# Patient Record
Sex: FEMALE | Race: WHITE | Employment: FULL TIME | URBAN - NONMETROPOLITAN AREA
[De-identification: names, ages, dates, MRNs, and addresses within clinical notes are randomized per-mention and may not be internally consistent; named-entity substitution may affect disease eponyms.]

---

## 2024-07-10 ENCOUNTER — HOSPITAL ENCOUNTER (OUTPATIENT)
Age: 49
Setting detail: OUTPATIENT SURGERY
Discharge: HOME | End: 2024-07-10
Attending: SPECIALIST | Admitting: SPECIALIST

## 2024-07-10 VITALS
SYSTOLIC BLOOD PRESSURE: 110 MMHG | HEART RATE: 66 BPM | WEIGHT: 178.2 LBS | HEIGHT: 70 IN | RESPIRATION RATE: 16 BRPM | TEMPERATURE: 97 F | BODY MASS INDEX: 25.51 KG/M2 | OXYGEN SATURATION: 96 % | DIASTOLIC BLOOD PRESSURE: 68 MMHG

## 2024-07-10 LAB — PREGNANCY, URINE: NEGATIVE

## 2024-07-10 PROCEDURE — 3600000002 HC SURGERY LEVEL 2 BASE: Performed by: SPECIALIST

## 2024-07-10 PROCEDURE — 7100000010 HC PHASE II RECOVERY - FIRST 15 MIN: Performed by: SPECIALIST

## 2024-07-10 PROCEDURE — 7100000011 HC PHASE II RECOVERY - ADDTL 15 MIN: Performed by: SPECIALIST

## 2024-07-10 PROCEDURE — 6370000000 HC RX 637 (ALT 250 FOR IP): Performed by: SPECIALIST

## 2024-07-10 PROCEDURE — 81025 URINE PREGNANCY TEST: CPT

## 2024-07-10 PROCEDURE — 2500000003 HC RX 250 WO HCPCS: Performed by: SPECIALIST

## 2024-07-10 PROCEDURE — 6370000000 HC RX 637 (ALT 250 FOR IP)

## 2024-07-10 PROCEDURE — 99153 MOD SED SAME PHYS/QHP EA: CPT | Performed by: SPECIALIST

## 2024-07-10 PROCEDURE — 99152 MOD SED SAME PHYS/QHP 5/>YRS: CPT | Performed by: SPECIALIST

## 2024-07-10 PROCEDURE — 3600000012 HC SURGERY LEVEL 2 ADDTL 15MIN: Performed by: SPECIALIST

## 2024-07-10 PROCEDURE — 2709999900 HC NON-CHARGEABLE SUPPLY: Performed by: SPECIALIST

## 2024-07-10 PROCEDURE — 6360000002 HC RX W HCPCS: Performed by: SPECIALIST

## 2024-07-10 RX ORDER — ONDANSETRON 4 MG/1
4 TABLET, ORALLY DISINTEGRATING ORAL ONCE
Status: COMPLETED | OUTPATIENT
Start: 2024-07-10 | End: 2024-07-10

## 2024-07-10 RX ORDER — ONDANSETRON 4 MG/1
TABLET, ORALLY DISINTEGRATING ORAL
Status: COMPLETED
Start: 2024-07-10 | End: 2024-07-10

## 2024-07-10 RX ORDER — LIDOCAINE HYDROCHLORIDE AND EPINEPHRINE 10; 10 MG/ML; UG/ML
INJECTION, SOLUTION INFILTRATION; PERINEURAL PRN
Status: DISCONTINUED | OUTPATIENT
Start: 2024-07-10 | End: 2024-07-10 | Stop reason: ALTCHOICE

## 2024-07-10 RX ORDER — BUPROPION HYDROCHLORIDE 150 MG/1
150 TABLET ORAL DAILY
COMMUNITY

## 2024-07-10 RX ORDER — SCOLOPAMINE TRANSDERMAL SYSTEM 1 MG/1
1 PATCH, EXTENDED RELEASE TRANSDERMAL ONCE
Status: DISCONTINUED | OUTPATIENT
Start: 2024-07-10 | End: 2024-07-10 | Stop reason: HOSPADM

## 2024-07-10 RX ORDER — FENTANYL CITRATE 50 UG/ML
INJECTION, SOLUTION INTRAMUSCULAR; INTRAVENOUS PRN
Status: DISCONTINUED | OUTPATIENT
Start: 2024-07-10 | End: 2024-07-10 | Stop reason: ALTCHOICE

## 2024-07-10 RX ORDER — BACITRACIN 500 [USP'U]/G
OINTMENT OPHTHALMIC PRN
Status: DISCONTINUED | OUTPATIENT
Start: 2024-07-10 | End: 2024-07-10 | Stop reason: ALTCHOICE

## 2024-07-10 RX ORDER — LEVOTHYROXINE SODIUM 0.05 MG/1
50 TABLET ORAL DAILY
COMMUNITY

## 2024-07-10 RX ORDER — MIDAZOLAM HYDROCHLORIDE 1 MG/ML
INJECTION INTRAMUSCULAR; INTRAVENOUS PRN
Status: DISCONTINUED | OUTPATIENT
Start: 2024-07-10 | End: 2024-07-10 | Stop reason: ALTCHOICE

## 2024-07-10 RX ORDER — ONDANSETRON 2 MG/ML
INJECTION INTRAMUSCULAR; INTRAVENOUS PRN
Status: DISCONTINUED | OUTPATIENT
Start: 2024-07-10 | End: 2024-07-10 | Stop reason: ALTCHOICE

## 2024-07-10 RX ADMIN — ONDANSETRON 4 MG: 4 TABLET, ORALLY DISINTEGRATING ORAL at 09:28

## 2024-07-10 ASSESSMENT — PAIN - FUNCTIONAL ASSESSMENT
PAIN_FUNCTIONAL_ASSESSMENT: 0-10
PAIN_FUNCTIONAL_ASSESSMENT: NONE - DENIES PAIN

## 2024-07-10 ASSESSMENT — PAIN SCALES - GENERAL
PAINLEVEL_OUTOF10: 1
PAINLEVEL_OUTOF10: 2
PAINLEVEL_OUTOF10: 4
PAINLEVEL_OUTOF10: 1
PAINLEVEL_OUTOF10: 2
PAINLEVEL_OUTOF10: 1
PAINLEVEL_OUTOF10: 1

## 2024-07-10 NOTE — ANESTHESIA PRE-OP
candidate to undergo the planned procedure sedation and anesthesia. (Refer to nursing sedation/analgesia documentation record)  4. Formulation and discussion of sedation/procedure plan, risks, and expectations with patient and/or responsible adult completed.  5. Patient examined immediately prior to the procedure. (Refer to nursing sedation/analgesia documentation record)    Paco Lopez MD  Electronically signed 7/10/2024 at 7:35 AM

## 2024-07-10 NOTE — ANESTHESIA POST-OP
[facility]  Sedation/Analgesia Post Sedation Record    Pt Name: Maria Guadalupe Herndon  MRN: [id number]  YOB: 1975  Procedure Performed By: Paco Lopez MD   Primary Care Physician: Paco Wood MD    POST-PROCEDURE    Physicians/Assistants: Paco Lopez MD  Procedure Performed: bilateral upper blepharoplasty   Sedation/Anesthesia: Versed 2.5 mg IV, fentanyl 125 mcg IV,    Estimated Blood Loss:     2  ml  Specimens Removed:  none     Disposition of Specimen: none     Complications: None Immediately appreciated     Post-procedure Diagnosis/Findings:      Unacceptable cosmetic appearance, status post bilateral upper blepharoplasty         Recommendations:    Transfer to PACU           Paco Lopez MD  Electronically signed 7/10/2024 at 8:49 AM

## 2024-07-10 NOTE — OP NOTE
Operative Note    Patient name: Maria Guadalupe Herndon             Medical Record Number: [id number]    Primary Care Physician: Paco Wood MD     1975    Date of Procedure: 7/10/2024    Pre-operative Diagnosis:  Unacceptable cosmetic appearance.    Post-operative Diagnosis: Same    Procedure Performed:   1. Bilateral upper blepharoplasty    Surgeons/Assistants: Dr. Paco Lopez MD /Enma Hickey PA-C/Fuad Sheehan DPM    Estimated Blood Loss: 2 ml    Complications: none immediately appreciated    Procedure:    With the patient lying in the supine position and under adequate IV sedation per the anesthesia team. The area was prepped and draped in the standard surgical fashion.  7ml or 1% Lidocaine 1:100,000 with epinephrine solution after the amount of excess eyelid skin was determined with a pinch test.  After waiting for 20 minutes for the full local/epinephrine effect incisions were made along the superior tarsal crease and full thickness removal of the 8mm excess upper eyelid skin was performed bilaterally.  Excess fat of middle and medial upper eyelid compartments was conservatively removed.  Closure was then completed in both these areas using a 5-0 silk suture placed in interrupted fashion and then a 4-0 Monocryl suture placed in a running subcuticular fashion resulting in 2mm of expected lagophthalmus.  Mastisol and Steri-strips being applied as a final dressing holding the tails of the subcuticular closures to the nasal bridge and lateral canthal areas respectively.  Ophthalmic antibiotic ointment was applied over the well approximated incisions.  The patient tolerated the procedure well and remained hemodynamically stable throughout the procedure and was quite comfortable throughout the operative course.        Paco Lopez MD  Electronically signed by me on 7/10/2024 at 8:50 AMOperative Note      Patient: Maria Guadalupe Herndon  YOB: 1975  MRN: [id number]    Date of Procedure:

## 2024-07-10 NOTE — H&P
[facility]  History and Physical Update    Pt Name: Maria Guadalupe Herndon  MRN: [id number]  YOB: 1975  Date of evaluation: 7/10/2024    I have examined the patient and reviewed the H&P/Consult and there are no changes to the patient or plans.      Paco Lopez MD  Electronically signed 7/10/2024 at 7:35 AM

## 2024-07-10 NOTE — PROGRESS NOTES
0851-Patient to Phase II via chair. Report received from Indu CASTELLON. Patient drowsy but responsive.Vitals obtained and stable. Respirations even and unlabored on room air. Patient denies pain, nausea. Incisions noted to upper eye lids. No drainage noted. Instructed on call light use.  at bedside.  0855-Patient provided with snack and drink. Denies needs. Call light in reach.  0915-Patient continues to rest in chair. Patient denies needs. Tolerating snack and drink.  0925-Discharge instructions reviewed. Verbalized understanding. Dr. Lopez to room. IV removed with no complication. Patient getting dressed.  0930-Patient reports being nauseated when attempting to get dressed. Zofran ODT given per order.   0940-Patient states she feel better. Patient ambulated to bathroom. Tolerated well.  0945-Patient meets discharge criteria.  Discharged in stable condition with responsible . All belongings given to patient. Patient ambulated to car with assistance from RN. Patient tolerated well.

## 2024-07-10 NOTE — DISCHARGE INSTRUCTIONS
POST OPERATIVE INSTRUCTION SHEET FOR BLEPHAROPLASTY      No strenuous activity for 5-6 weeks postoperatively. Do no lift anything heavier than 8-10 pounds.  Driving will be resumed after surgery upon the doctor's approval.   Do not drive while taking narcotic medications.  It is recommended that you sleep in a recliner or with your head elevated to help with the swelling.  Frozen peas in a ziplock bag should be applied to the eyes for 20 minute intervals.  Wrap the peas in a cloth before application.  Peas are recommended over ice because they form to the face better.  This should be done for the first three to five days post-op unless instructed differently.  You may walk as often as you like and you may climb stairs.  Just remember to rest as soon as you begin to feel tired.   You may resume your diet as tolerated. Try to eat a well balanced diet.    If your temperature is above 101 degrees orally, if you have increased pain and the medication  does not relieve, if drainage soaks through the dressings or if you notice an unusual odor, call the office immediately.    It is very important that you take your medication as exactly directed by the physician.  If the pain is not severe, you may wish to try Tylenol or extra-strength Tylenol.  Do not take aspirin or ibuprofen products until approved by the physician.    Do not apply heat of any kind to the facial area ,due to possible changes in sensation in the facial area, heat can be very harmful to the tissue.    8.   ABSOLUTELY NO NICOTINE OF ANY TYPE.   9.Follow up on Tuesday, 7/16/2024 at 3:00pm  10. Apply ophthalmic bacitracin to incisions three times a day for four days. Do not use longer than four days.  11. Leave white steri strips in place.     If you have any questions about your instructions, please call the office at [phone].     How Do you Prevent Surgical Site Infections?       *HAND WASHING     *STOP SHAVING   Wash your hands multiple times

## (undated) DEVICE — ELECTRODE NDL S STL 275IN

## (undated) DEVICE — SUTURE PERMAHAND SZ 6-0 L18IN NONABSORBABLE BLK L11MM P-1 639G

## (undated) DEVICE — PAD,EYE,1-5/8X2 5/8,STERILE,LF,1/PK: Brand: MEDLINE

## (undated) DEVICE — SUTURE PROL SZ 5-0 L18IN NONABSORBABLE BLU L13MM P-3 3/8 8698G

## (undated) DEVICE — PACK PROCEDURE SURG PLAS SC MIN SRHP LF

## (undated) DEVICE — SHEET, ORTHO, SPLIT, STERILE: Brand: MEDLINE

## (undated) DEVICE — STANDARD HYPODERMIC NEEDLE,POLYPROPYLENE HUB: Brand: MONOJECT

## (undated) DEVICE — SUTURE MONOCRYL SZ 4-0 L18IN ABSRB UD P-3 L13MM 3/8 CIR PRIM Y494G

## (undated) DEVICE — TUBING, SUCTION, 1/4" X 12', STRAIGHT: Brand: MEDLINE

## (undated) DEVICE — Device

## (undated) DEVICE — GLOVE ORANGE PI 7   MSG9070

## (undated) DEVICE — GLOVE SURG SZ 7.5 L11.73IN FNGR THK9.8MIL STRW LTX POLYMER

## (undated) DEVICE — GLOVE SURG SZ 8 L11.77IN FNGR THK9.8MIL STRW LTX POLYMER

## (undated) DEVICE — Z INACTIVE USE 2660665 SOLUTION IRRIG 1000ML 0.9% SOD CHL USP POUR PLAS BTL

## (undated) DEVICE — Z INACTIVE USE 2854097 SPONGE GZ W4XL4IN COT 12 PLY TYP VII WVN C FLD DSGN

## (undated) DEVICE — SOLUTION IRRIG 1500ML STRL H2O USP POUR PLAS BTL

## (undated) DEVICE — SUTURE PLN GUT SZ 5-0 L18IN ABSRB YELLOWISH TAN L13MM PC-1 1915G

## (undated) DEVICE — 3M™ STERI-STRIP™ REINFORCED ADHESIVE SKIN CLOSURES, R1547, 1/2 IN X 4 IN (12 MM X 100 MM), 6 STRIPS/ENVELOPE: Brand: 3M™ STERI-STRIP™

## (undated) DEVICE — SUTURE MONOCRYL SZ 3-0 L27IN ABSRB UD L26MM SH 1/2 CIR Y416H

## (undated) DEVICE — GOWN,SIRUS,NON REINFRCD,LARGE,SET IN SL: Brand: MEDLINE

## (undated) DEVICE — SUTURE ETHIBOND EXCEL SZ 3-0 L36IN NONABSORBABLE GRN L22MM X762H